# Patient Record
Sex: FEMALE | Race: ASIAN | NOT HISPANIC OR LATINO | URBAN - METROPOLITAN AREA
[De-identification: names, ages, dates, MRNs, and addresses within clinical notes are randomized per-mention and may not be internally consistent; named-entity substitution may affect disease eponyms.]

---

## 2017-11-05 ENCOUNTER — EMERGENCY (EMERGENCY)
Facility: HOSPITAL | Age: 22
LOS: 1 days | Discharge: HOME | End: 2017-11-05
Attending: EMERGENCY MEDICINE | Admitting: EMERGENCY MEDICINE
Payer: MEDICAID

## 2017-11-05 VITALS
RESPIRATION RATE: 18 BRPM | DIASTOLIC BLOOD PRESSURE: 72 MMHG | TEMPERATURE: 98 F | OXYGEN SATURATION: 100 % | HEART RATE: 74 BPM | SYSTOLIC BLOOD PRESSURE: 104 MMHG

## 2017-11-05 VITALS
TEMPERATURE: 99 F | SYSTOLIC BLOOD PRESSURE: 124 MMHG | RESPIRATION RATE: 18 BRPM | HEART RATE: 104 BPM | OXYGEN SATURATION: 100 % | DIASTOLIC BLOOD PRESSURE: 82 MMHG

## 2017-11-05 DIAGNOSIS — N83.209 UNSPECIFIED OVARIAN CYST, UNSPECIFIED SIDE: ICD-10-CM

## 2017-11-05 LAB
APPEARANCE UR: SIGNIFICANT CHANGE UP
BACTERIA # UR AUTO: SIGNIFICANT CHANGE UP
BILIRUB UR-MCNC: NEGATIVE — SIGNIFICANT CHANGE UP
BLOOD UR QL VISUAL: NEGATIVE — SIGNIFICANT CHANGE UP
COLOR SPEC: YELLOW — SIGNIFICANT CHANGE UP
GLUCOSE UR-MCNC: NEGATIVE — SIGNIFICANT CHANGE UP
KETONES UR-MCNC: NEGATIVE — SIGNIFICANT CHANGE UP
LEUKOCYTE ESTERASE UR-ACNC: HIGH
MUCOUS THREADS # UR AUTO: SIGNIFICANT CHANGE UP
NITRITE UR-MCNC: NEGATIVE — SIGNIFICANT CHANGE UP
NON-SQ EPI CELLS # UR AUTO: <1 — SIGNIFICANT CHANGE UP
PH UR: 6.5 — SIGNIFICANT CHANGE UP (ref 4.6–8)
PROT UR-MCNC: 10 — SIGNIFICANT CHANGE UP
RBC CASTS # UR COMP ASSIST: SIGNIFICANT CHANGE UP (ref 0–?)
SP GR SPEC: 1.02 — SIGNIFICANT CHANGE UP (ref 1–1.03)
SQUAMOUS # UR AUTO: SIGNIFICANT CHANGE UP
UROBILINOGEN FLD QL: NORMAL E.U. — SIGNIFICANT CHANGE UP (ref 0.1–0.2)
WBC UR QL: HIGH (ref 0–?)

## 2017-11-05 PROCEDURE — 99284 EMERGENCY DEPT VISIT MOD MDM: CPT

## 2017-11-05 PROCEDURE — 76856 US EXAM PELVIC COMPLETE: CPT | Mod: 26

## 2017-11-05 PROCEDURE — 99283 EMERGENCY DEPT VISIT LOW MDM: CPT

## 2017-11-05 NOTE — CONSULT NOTE ADULT - SUBJECTIVE AND OBJECTIVE BOX
22y G0, LMP 10/23 presents with resolved RLQ abdominal pain.  Patient had RLQ abdominal pain for 2 days but it has since resolved. Pain was 5-6/10, constant, and not associated with nausea or vomiting.  Patient has no other complaints.  Patient is not sexually active and           OB/GYN HISTORY: heavy periods treated with OCPs 4 years ago  PAST MEDICAL & SURGICAL HISTORY:  Dysphagia  GERD (gastroesophageal reflux disease)  No significant past surgical history    Allergies    No Known Allergies    Intolerances      MEDICATIONS  (STANDING): None    SOCIAL HISTORY:    Name of GYN Physician:  Date of Last Pap:  History of Abnormal Pap:  Date of Last Mammogram:  Date of Last Colonoscopy:       Vital Signs Last 24 Hrs  T(C): 36.8 (2017 12:25), Max: 37.1 (2017 10:22)  T(F): 98.2 (2017 12:25), Max: 98.8 (2017 10:22)  HR: 74 (2017 12:25) (74 - 104)  BP: 104/72 (2017 12:25) (104/72 - 128/78)  BP(mean): --  RR: 18 (2017 12:25) (18 - 18)  SpO2: 100% (2017 12:25) (98% - 100%)    PHYSICAL EXAM:      Constitutional: alert and oriented x 3    Breasts: no tenderness, no nodules    Respiratory: clear    Cardiovascular: regular rate and rhythm    Gastrointestinal: soft, non tender, + bowel sounds. No hepatosplenomegaly, no palpable masses    Genitourinary: NEFG  Cervix: closed/ long, no CMT  Uterus: normal size, non tender  Adnexa: non tender, no palpable masses    Rectal:     Extremities:    Neurological:    Skin:    Lymph Nodes:        LABS:            Urinalysis Basic - ( 2017 11:00 )    Color: YELLOW / Appearance: HAZY / S.021 / pH: 6.5  Gluc: NEGATIVE / Ketone: NEGATIVE  / Bili: NEGATIVE / Urobili: NORMAL E.U.   Blood: NEGATIVE / Protein: 10 / Nitrite: NEGATIVE   Leuk Esterase: SMALL / RBC: 2-5 / WBC 5-10   Sq Epi: MANY / Non Sq Epi: x / Bacteria: FEW            RADIOLOGY & ADDITIONAL STUDIES:  TAUS (): EXAM:  US PELVIC COMPLETE        PROCEDURE DATE:  2017         INTERPRETATION:  CLINICAL INFORMATION: Right adnexal pain 2 days ago    LMP: 10/23/2017. Urine pregnancy test is pending, however patient has   never been sexually active.    COMPARISON: None available.    TECHNIQUE:     Transabdominal pelvic sonogram was performed. Endovaginal examination was   declined due to patient's Confucianism practices. Color and Spectral Doppler   was performed.    FINDINGS:    Uterus: 7.6 x 3.1 x 4.6 cm. Within normal limits.    Endometrium: 13 mm. Within normal limits.    Right ovary: 2.6 x 1.6 x 2.2 cm. A large, 10.8 x 7.8 x 12.4 cm   paraovarian cyst is noted. There is normal ovarian flow.    Left ovary: 3.2 x 2.2 x 2.3 cm, containing a 1.4 x 1.1 x 1.8 cm   follicular cyst.    Fluid: None.    IMPRESSION:  Large right paraovarian cyst measuring up to 12.4 cm. No current evidence   of right ovarian torsion, however given the size of the cyst, repeated or   intermittent torsion events are likely. 22y G0, LMP 10/23 presents with resolved RLQ abdominal pain.  Patient had RLQ abdominal pain for 2 days but it has since resolved. Pain was 5-6/10, constant, and not associated with nausea or vomiting.  Patient has no other complaints.  Patient is not sexually active and is refusing pelvic exam and transvaginal ultrasound.    OB/GYN HISTORY: heavy periods treated with OCPs 4 years ago  PAST MEDICAL & SURGICAL HISTORY:  Dysphagia  GERD (gastroesophageal reflux disease)  No significant past surgical history    Allergies: No Known Allergies  MEDICATIONS  (STANDING): None      Vital Signs Last 24 Hrs  T(C): 36.8 (2017 12:25), Max: 37.1 (2017 10:22)  T(F): 98.2 (2017 12:25), Max: 98.8 (2017 10:)  HR: 74 (2017 12:25) (74 - 104)  BP: 104/72 (2017 12:25) (104/72 - 128/78)  BP(mean): --  RR: 18 (2017 12:25) (18 - 18)  SpO2: 100% (2017 12:25) (98% - 100%)    PHYSICAL EXAM:    Constitutional: alert and oriented x 3    Respiratory: clear    Cardiovascular: regular rate and rhythm    Gastrointestinal: soft, non tender, + bowel sounds. No hepatosplenomegaly, no palpable masses    Genitourinary: refused    Urinalysis Basic - ( 2017 11:00 )    Color: YELLOW / Appearance: HAZY / S.021 / pH: 6.5  Gluc: NEGATIVE / Ketone: NEGATIVE  / Bili: NEGATIVE / Urobili: NORMAL E.U.   Blood: NEGATIVE / Protein: 10 / Nitrite: NEGATIVE   Leuk Esterase: SMALL / RBC: 2-5 / WBC 5-10   Sq Epi: MANY / Non Sq Epi: x / Bacteria: FEW        RADIOLOGY & ADDITIONAL STUDIES:  TAUS (): EXAM:  US PELVIC COMPLETE        PROCEDURE DATE:  2017         INTERPRETATION:  CLINICAL INFORMATION: Right adnexal pain 2 days ago    LMP: 10/23/2017. Urine pregnancy test is pending, however patient has   never been sexually active.    COMPARISON: None available.    TECHNIQUE:     Transabdominal pelvic sonogram was performed. Endovaginal examination was   declined due to patient's Hinduism practices. Color and Spectral Doppler   was performed.    FINDINGS:    Uterus: 7.6 x 3.1 x 4.6 cm. Within normal limits.    Endometrium: 13 mm. Within normal limits.    Right ovary: 2.6 x 1.6 x 2.2 cm. A large, 10.8 x 7.8 x 12.4 cm   paraovarian cyst is noted. There is normal ovarian flow.    Left ovary: 3.2 x 2.2 x 2.3 cm, containing a 1.4 x 1.1 x 1.8 cm   follicular cyst.    Fluid: None.    IMPRESSION:  Large right paraovarian cyst measuring up to 12.4 cm. No current evidence   of right ovarian torsion, however given the size of the cyst, repeated or   intermittent torsion events are likely.

## 2017-11-05 NOTE — CONSULT NOTE ADULT - ATTENDING COMMENTS
pt seen by me personally   vss afebrile  ucg neg not sexually active  abd soft nt nd    a/p known cyst stable no evidence torsion  discussed with pt need for fu and likely removal  clinic appointment to be given for this week  strict instructions for any pain

## 2017-11-05 NOTE — CONSULT NOTE ADULT - PROBLEM SELECTOR RECOMMENDATION 9
-Patient stable for discharge to follow-up outpatient this week  -tylenol and Motrin as needed for analgesia  -Torsion precautions given    Patient seen and examined with Dr Diana Valencia PGY2

## 2017-11-05 NOTE — ED PROVIDER NOTE - OBJECTIVE STATEMENT
23 y/o F with no pertinent PMHx presents to the ED with complaint of R side abdominal pain. Pt notes prior visit to [facility] for possible ovarian cyst, but notes benign sonogram findings. She states that yesterday at 3 pm pt felt sudden pain in the R side of her abdomen. She states that the pain went away, but decided to come in to check up. She denies any other medical issues, and pas t surgeries. She is otherwise healthy, and has no complaints urinary issues, HA, fever, nausea/vomiting/diarrhea, discharge, and no other complaints.

## 2017-11-05 NOTE — ED ADULT TRIAGE NOTE - CHIEF COMPLAINT QUOTE
c/o RLQ abd pain x 2 days "I actually feel better today but the pain was bad."  denies dysuria.  denies pregnancy.   h/o right ovarian cyst

## 2017-11-05 NOTE — ED PROVIDER NOTE - MEDICAL DECISION MAKING DETAILS
Likely ovarian cyst will rule out possible ovarian torsion. Likely ovarian cyst will rule out possible ovarian torsion.  Obtain transabdominal pelvic ultrasound as patient has never been sexually active.  Also check UA for UTI.

## 2017-11-13 ENCOUNTER — OUTPATIENT (OUTPATIENT)
Dept: OUTPATIENT SERVICES | Facility: HOSPITAL | Age: 22
LOS: 1 days | End: 2017-11-13

## 2017-11-13 ENCOUNTER — APPOINTMENT (OUTPATIENT)
Dept: OBGYN | Facility: HOSPITAL | Age: 22
End: 2017-11-13
Payer: MEDICAID

## 2017-11-13 VITALS
SYSTOLIC BLOOD PRESSURE: 117 MMHG | BODY MASS INDEX: 22.96 KG/M2 | DIASTOLIC BLOOD PRESSURE: 84 MMHG | HEIGHT: 64 IN | WEIGHT: 134.5 LBS | HEART RATE: 90 BPM

## 2017-11-13 PROCEDURE — 99213 OFFICE O/P EST LOW 20 MIN: CPT | Mod: GE

## 2017-11-14 DIAGNOSIS — N83.202 UNSPECIFIED OVARIAN CYST, LEFT SIDE: ICD-10-CM

## 2018-08-17 ENCOUNTER — EMERGENCY (EMERGENCY)
Facility: HOSPITAL | Age: 23
LOS: 1 days | Discharge: HOME | End: 2018-08-17
Attending: EMERGENCY MEDICINE | Admitting: EMERGENCY MEDICINE
Payer: MEDICAID

## 2018-08-17 VITALS
TEMPERATURE: 98 F | RESPIRATION RATE: 16 BRPM | HEART RATE: 72 BPM | SYSTOLIC BLOOD PRESSURE: 101 MMHG | DIASTOLIC BLOOD PRESSURE: 72 MMHG | OXYGEN SATURATION: 100 %

## 2018-08-17 VITALS
TEMPERATURE: 98 F | RESPIRATION RATE: 16 BRPM | OXYGEN SATURATION: 100 % | HEART RATE: 98 BPM | SYSTOLIC BLOOD PRESSURE: 136 MMHG | DIASTOLIC BLOOD PRESSURE: 66 MMHG

## 2018-08-17 DIAGNOSIS — N83.201 UNSPECIFIED OVARIAN CYST, RIGHT SIDE: ICD-10-CM

## 2018-08-17 LAB
ALBUMIN SERPL ELPH-MCNC: 4.3 G/DL — SIGNIFICANT CHANGE UP (ref 3.3–5)
ALP SERPL-CCNC: 50 U/L — SIGNIFICANT CHANGE UP (ref 40–120)
ALT FLD-CCNC: 10 U/L — SIGNIFICANT CHANGE UP (ref 4–33)
ANISOCYTOSIS BLD QL: SIGNIFICANT CHANGE UP
APPEARANCE UR: CLEAR — SIGNIFICANT CHANGE UP
APTT BLD: 28.4 SEC — SIGNIFICANT CHANGE UP (ref 27.5–37.4)
AST SERPL-CCNC: 13 U/L — SIGNIFICANT CHANGE UP (ref 4–32)
BACTERIA # UR AUTO: NEGATIVE — SIGNIFICANT CHANGE UP
BASOPHILS # BLD AUTO: 0.05 K/UL — SIGNIFICANT CHANGE UP (ref 0–0.2)
BASOPHILS NFR BLD AUTO: 0.5 % — SIGNIFICANT CHANGE UP (ref 0–2)
BILIRUB SERPL-MCNC: 0.2 MG/DL — SIGNIFICANT CHANGE UP (ref 0.2–1.2)
BILIRUB UR-MCNC: NEGATIVE — SIGNIFICANT CHANGE UP
BLOOD UR QL VISUAL: NEGATIVE — SIGNIFICANT CHANGE UP
BUN SERPL-MCNC: 11 MG/DL — SIGNIFICANT CHANGE UP (ref 7–23)
CALCIUM SERPL-MCNC: 9.3 MG/DL — SIGNIFICANT CHANGE UP (ref 8.4–10.5)
CHLORIDE SERPL-SCNC: 101 MMOL/L — SIGNIFICANT CHANGE UP (ref 98–107)
CO2 SERPL-SCNC: 24 MMOL/L — SIGNIFICANT CHANGE UP (ref 22–31)
COLOR SPEC: COLORLESS — SIGNIFICANT CHANGE UP
CREAT SERPL-MCNC: 0.99 MG/DL — SIGNIFICANT CHANGE UP (ref 0.5–1.3)
ELLIPTOCYTES BLD QL SMEAR: SLIGHT — SIGNIFICANT CHANGE UP
EOSINOPHIL # BLD AUTO: 0.09 K/UL — SIGNIFICANT CHANGE UP (ref 0–0.5)
EOSINOPHIL NFR BLD AUTO: 0.9 % — SIGNIFICANT CHANGE UP (ref 0–6)
GLUCOSE SERPL-MCNC: 88 MG/DL — SIGNIFICANT CHANGE UP (ref 70–99)
GLUCOSE UR-MCNC: NEGATIVE — SIGNIFICANT CHANGE UP
HCG SERPL-ACNC: < 5 MIU/ML — SIGNIFICANT CHANGE UP
HCT VFR BLD CALC: 38.5 % — SIGNIFICANT CHANGE UP (ref 34.5–45)
HGB BLD-MCNC: 12.1 G/DL — SIGNIFICANT CHANGE UP (ref 11.5–15.5)
HYPOCHROMIA BLD QL: SLIGHT — SIGNIFICANT CHANGE UP
IMM GRANULOCYTES # BLD AUTO: 0.04 # — SIGNIFICANT CHANGE UP
IMM GRANULOCYTES NFR BLD AUTO: 0.4 % — SIGNIFICANT CHANGE UP (ref 0–1.5)
INR BLD: 0.97 — SIGNIFICANT CHANGE UP (ref 0.88–1.17)
KETONES UR-MCNC: NEGATIVE — SIGNIFICANT CHANGE UP
LEUKOCYTE ESTERASE UR-ACNC: SIGNIFICANT CHANGE UP
LYMPHOCYTES # BLD AUTO: 2.23 K/UL — SIGNIFICANT CHANGE UP (ref 1–3.3)
LYMPHOCYTES # BLD AUTO: 22.3 % — SIGNIFICANT CHANGE UP (ref 13–44)
MANUAL SMEAR VERIFICATION: SIGNIFICANT CHANGE UP
MCHC RBC-ENTMCNC: 22.5 PG — LOW (ref 27–34)
MCHC RBC-ENTMCNC: 31.4 % — LOW (ref 32–36)
MCV RBC AUTO: 71.7 FL — LOW (ref 80–100)
MICROCYTES BLD QL: SIGNIFICANT CHANGE UP
MONOCYTES # BLD AUTO: 0.79 K/UL — SIGNIFICANT CHANGE UP (ref 0–0.9)
MONOCYTES NFR BLD AUTO: 7.9 % — SIGNIFICANT CHANGE UP (ref 2–14)
NEUTROPHILS # BLD AUTO: 6.82 K/UL — SIGNIFICANT CHANGE UP (ref 1.8–7.4)
NEUTROPHILS NFR BLD AUTO: 68 % — SIGNIFICANT CHANGE UP (ref 43–77)
NITRITE UR-MCNC: NEGATIVE — SIGNIFICANT CHANGE UP
NRBC # FLD: 0 — SIGNIFICANT CHANGE UP
OVALOCYTES BLD QL SMEAR: SLIGHT — SIGNIFICANT CHANGE UP
PH UR: 6 — SIGNIFICANT CHANGE UP (ref 5–8)
PLATELET # BLD AUTO: 284 K/UL — SIGNIFICANT CHANGE UP (ref 150–400)
PLATELET COUNT - ESTIMATE: NORMAL — SIGNIFICANT CHANGE UP
PMV BLD: 10.7 FL — SIGNIFICANT CHANGE UP (ref 7–13)
POIKILOCYTOSIS BLD QL AUTO: SIGNIFICANT CHANGE UP
POTASSIUM SERPL-MCNC: 3.9 MMOL/L — SIGNIFICANT CHANGE UP (ref 3.5–5.3)
POTASSIUM SERPL-SCNC: 3.9 MMOL/L — SIGNIFICANT CHANGE UP (ref 3.5–5.3)
PROT SERPL-MCNC: 7.3 G/DL — SIGNIFICANT CHANGE UP (ref 6–8.3)
PROT UR-MCNC: NEGATIVE — SIGNIFICANT CHANGE UP
PROTHROM AB SERPL-ACNC: 11.2 SEC — SIGNIFICANT CHANGE UP (ref 9.8–13.1)
RBC # BLD: 5.37 M/UL — HIGH (ref 3.8–5.2)
RBC # FLD: 15.4 % — HIGH (ref 10.3–14.5)
RBC CASTS # UR COMP ASSIST: SIGNIFICANT CHANGE UP (ref 0–?)
SODIUM SERPL-SCNC: 140 MMOL/L — SIGNIFICANT CHANGE UP (ref 135–145)
SP GR SPEC: 1 — SIGNIFICANT CHANGE UP (ref 1–1.04)
SQUAMOUS # UR AUTO: SIGNIFICANT CHANGE UP
UROBILINOGEN FLD QL: NORMAL — SIGNIFICANT CHANGE UP
WBC # BLD: 10.02 K/UL — SIGNIFICANT CHANGE UP (ref 3.8–10.5)
WBC # FLD AUTO: 10.02 K/UL — SIGNIFICANT CHANGE UP (ref 3.8–10.5)
WBC CASTS UR QL COMP ASSIST: NEGATIVE — SIGNIFICANT CHANGE UP
WBC UR QL: HIGH (ref 0–?)

## 2018-08-17 PROCEDURE — 76856 US EXAM PELVIC COMPLETE: CPT | Mod: 26

## 2018-08-17 PROCEDURE — 99235 HOSP IP/OBS SAME DATE MOD 70: CPT | Mod: GC

## 2018-08-17 PROCEDURE — 93976 VASCULAR STUDY: CPT | Mod: 26,59

## 2018-08-17 PROCEDURE — 74177 CT ABD & PELVIS W/CONTRAST: CPT | Mod: 26

## 2018-08-17 PROCEDURE — 99284 EMERGENCY DEPT VISIT MOD MDM: CPT

## 2018-08-17 RX ORDER — ACETAMINOPHEN 500 MG
975 TABLET ORAL ONCE
Qty: 0 | Refills: 0 | Status: DISCONTINUED | OUTPATIENT
Start: 2018-08-17 | End: 2018-08-17

## 2018-08-17 RX ORDER — SODIUM CHLORIDE 9 MG/ML
1000 INJECTION INTRAMUSCULAR; INTRAVENOUS; SUBCUTANEOUS ONCE
Qty: 0 | Refills: 0 | Status: COMPLETED | OUTPATIENT
Start: 2018-08-17 | End: 2018-08-17

## 2018-08-17 RX ORDER — ACETAMINOPHEN 500 MG
1000 TABLET ORAL ONCE
Qty: 0 | Refills: 0 | Status: COMPLETED | OUTPATIENT
Start: 2018-08-17 | End: 2018-08-17

## 2018-08-17 RX ADMIN — SODIUM CHLORIDE 1000 MILLILITER(S): 9 INJECTION INTRAMUSCULAR; INTRAVENOUS; SUBCUTANEOUS at 14:10

## 2018-08-17 RX ADMIN — Medication 400 MILLIGRAM(S): at 14:07

## 2018-08-17 NOTE — CONSULT NOTE ADULT - PROBLEM SELECTOR RECOMMENDATION 9
No Torsion:   -no acute GYN intervention at this time  -patient counseled on PO pain medication for pain relief prn   -patient instructed to follow up in GYN clinic on Wednesday at 1pm to schedule laparoscopic surgery to remove cyst   -patient counselled on reasons to return to ED including severe abdominal pain not responsive to pain medications or inability to tolerate PO intake.  Patient expressed understanding.  All questions/concerns addressed.   -patient cleared for discharge from OBGYN perspective.  Primary managment per ED team.     Pt seen and d/w Dr. Robyn Beckman PGY2

## 2018-08-17 NOTE — CONSULT NOTE ADULT - ASSESSMENT
22y G0    here w/ complaint of abdominal pain with US showing a 12x9.6x10cm right sided paraovarian cyst unchanged from previous imaging 11/2017. Patient is without abdominal pain at this time, with benign exam.    No Torsion:   Patient with overall benign abdomen although with some mild tenderness to palpation on side of cyst.  Differential for pain includes pain from large space occupying ovarian cyst.  Low concern for torsion at this time given benign abdomen and clinical picture with overall well appearing well mentating patient.      Given size of cyst  it was explained to patient that she would benefit from follow up with an OBGYN in [facility] clinic to discuss scheduled laprascopic ovarian cystectomy.  Explained need for follow up with in GYN clinic on Wednesday at 1pm and provided patient with excusal note for work in order for this visit. Given patient declines surgical intervention at this time, patient was extensively counselled on reasons to return to ED including severe abdominal pain not responsive to pain medications or inability to tolerate PO intake.  Patient expressed understanding.  All questions/concerns addressed.

## 2018-08-17 NOTE — CONSULT NOTE ADULT - SUBJECTIVE AND OBJECTIVE BOX
Consulted for RLQ with ovarian cyst at 4:45pm, patient seen and evaluated by resident at 5pm. Pt seen with attending at 5:30pm. full consult note to follow Consulted for RLQ with ovarian cyst at 4:45pm, patient seen and evaluated by resident at 5pm. Pt seen with attending at 5:30pm.     Patient is 23yo G0 LMP 18 presenting with chief complaint of abdominal pain. Patient notes that pain started on at 9am and was intermittent and twisting in nature. Pain occurs in episodes lasting <30sec, 10-12x today. Pain severity is 3-4/10. Patient admits to having had similar pain in November when she came to ED and was found to have a large right ovarian cyst. Patient denies pain at this time. She received tylenol at 2pm for pain but said that her pain was mild.  When patient previously came to ED in November she was counselled regarding surgery but declined surgical intervention at that time. She then was lost to follow up after presenting for one preop clinic visit. Today patient presents to ED with goal of following up regarding her cyst in order to pursue surgery for its removal. She denies any pain during at this time and does not desire surgery today. Patient would prefer to schedule surgery for a later date when her parents are available.     She denies chest pain, shortness of breath, nausea, vomiting, fevers, and chills. Patient denies vaginal bleeding, abnormal vaginal discharge, and spotting. Patient denies dysuria, hematuria, back pain and urinary frequency. Patient is passing flatus and having formed bowel movements. She denies constipation and diarrhea.      OB/GYN HISTORY: h/o R ovarian cyst [id number] in 2017, h/o heavy periods treated with OCPs 4 years ago; menarche 9yo, regular menses q28d, bleeding 4-5d, 4pads per day. Never sexually active,   PAST MEDICAL & SURGICAL HISTORY:  Dysphagia  GERD (gastroesophageal reflux disease)  No significant past surgical history    Allergies: No Known Allergies  MEDICATIONS  (STANDING): None      O:   Vital Signs Last 24 Hrs  T(C): 36.7 (17 Aug 2018 15:43), Max: 36.8 (17 Aug 2018 11:46)  T(F): 98.1 (17 Aug 2018 15:43), Max: 98.3 (17 Aug 2018 11:46)  HR: 72 (17 Aug 2018 15:43) (72 - 98)  BP: 101/72 (17 Aug 2018 15:43) (101/72 - 136/66)  BP(mean): --  RR: 16 (17 Aug 2018 15:43) (16 - 16)  SpO2: 100% (17 Aug 2018 15:43) (100% - 100%)    PE:  Gen: Comfortable, NAD  CV: RRR  Pulm: CTAB  Abd: Soft, nondistended, no rebound, no guarding, +mass palpated in RLQ with mild tenderness on exam  Pelvic exam: Patient refused     LABS:                        12.1   10.02 )-----------( 284      ( 17 Aug 2018 14:25 )             38.5         140  |  101  |  11  ----------------------------<  88  3.9   |  24  |  0.99    Ca    9.3      17 Aug 2018 14:25    TPro  7.3  /  Alb  4.3  /  TBili  0.2  /  DBili  x   /  AST  13  /  ALT  10  /  AlkPhos  50      PT/INR - ( 17 Aug 2018 14:25 )   PT: 11.2 SEC;   INR: 0.97          PTT - ( 17 Aug 2018 14:25 )  PTT:28.4 SEC  Urinalysis Basic - ( 17 Aug 2018 14:25 )    Color: COLORLESS / Appearance: CLEAR / S.005 / pH: 6.0  Gluc: NEGATIVE / Ketone: NEGATIVE  / Bili: NEGATIVE / Urobili: NORMAL   Blood: NEGATIVE / Protein: NEGATIVE / Nitrite: NEGATIVE   Leuk Esterase: SMALL / RBC: 0-2 / WBC 6-10   Sq Epi: FEW / Non Sq Epi: x / Bacteria: NEGATIVE        RADIOLOGY & ADDITIONAL STUDIES:  EXAM:  US PELVIC COMPLETE      EXAM:  US DPLX PELVIC      PROCEDURE DATE:  Aug 17 2018   INTERPRETATION:  CLINICAL INFORMATION: 22-year-old female with right   lower quadrant pain. Known right paraovarian cyst.    LMP: 2018    COMPARISON: Ultrasound 2017     TECHNIQUE:     Transabdominal pelvic sonogram only. With color and spectral Doppler.      FINDINGS:    Uterus: 6.3 x 3.8 x 4.5 cm. Within normal limits.    Endometrium: 15 mm. Within normal limits.    Right ovary: Paraovarian cyst 11.9 x 9.6 x 9.9 cm. Unchanged from   2017 without internal hemorrhage. Patient indicated only mild pain   related to the cyst. Otherwise normal morphology right ovary.    Left ovary: Within normal limits.    Fluid: None.    IMPRESSION:    Large paraovarian cyst unchanged from 2017 with mild tenderness.    DIAMOND TREJO M.D., ATTENDING RADIOLOGIST  This document has been electronically signed. Aug 17 2018  3:25PM        < from: CT Abdomen and Pelvis w/ Oral Cont and w/ IV Cont (18 @ 16:48) >    EXAM:  CT ABDOMEN AND PELVIS OC IC        PROCEDURE DATE:  Aug 17 2018         INTERPRETATION:  CLINICAL INFORMATION: RLQ pain.    COMPARISON: Pelvic sonogram 2018.    PROCEDURE:   CT of the Abdomen and Pelvis was performed with intravenous contrast.   Intravenous contrast: 90 ml Omnipaque 350. 10 ml discarded.  Oral contrast: positive contrast was administered.  Sagittal and coronal reformats were performed.    FINDINGS:    LOWER CHEST: Within normal limits.    LIVER: Within normal limits.  BILE DUCTS: Normal caliber.  GALLBLADDER: Within normal limits.  SPLEEN: Within normal limits.  PANCREAS: Within normal limits.  ADRENALS: Within normal limits.  KIDNEYS/URETERS: Within normal limits.    BLADDER: Within normal limits.  REPRODUCTIVEORGANS: There is a 12 cm right para ovarian cyst, stable in   size from the pelvic ultrasound dated 2018. Uterus is normal. 1.5 cm   right ovarian corpus luteal cyst.     BOWEL: No bowel obstruction. Appendix is normal.   PERITONEUM: No ascites.  VESSELS: Within normal limits. Incidental circumaortic left renal vein.  RETROPERITONEUM: No lymphadenopathy.    ABDOMINAL WALL: Within normal limits.  BONES: Within normal limits. Small bone island in the right   intertrochanteric region of the femur.    IMPRESSION: Normal appendix.  Stable right paraovarian cyst.       CINDY PELAYO M.D., RADIOLOGY RESIDENT  This document has been electronically signed.  GAGANDEEP AGUILERA M.D., ATTENDING RADIOLOGIST  This document has been electronically signed.Aug 17 2018  5:16PM

## 2018-08-17 NOTE — ED PROVIDER NOTE - ATTENDING CONTRIBUTION TO CARE
Dr. Berman: I have personally seen and examined this patient at the bedside. I have fully participated in the care of this patient. I have reviewed all pertinent clinical information, including history, physical exam, plan and the Resident's note and agree except as noted. HPI above as by me. PE above as by me. RLQ pain in 23yo female with known large ovary cyst DDX ectopic, ovarian torsion, appy PLAN upreg, cbc, cmp, ua, ct abd pelvis po and iv, transabd sono.

## 2018-08-17 NOTE — ED ADULT NURSE NOTE - NSIMPLEMENTINTERV_GEN_ALL_ED
Implemented All Universal Safety Interventions:  [place] to call system. Call bell, personal items and telephone within reach. Instruct patient to call for assistance. Room bathroom lighting operational. Non-slip footwear when patient is off stretcher. Physically safe environment: no spills, clutter or unnecessary equipment. Stretcher in lowest position, wheels locked, appropriate side rails in place.

## 2018-08-17 NOTE — ED PROVIDER NOTE - MEDICAL DECISION MAKING DETAILS
RLQ pain in 21yo female with known large ovary cyst DDX ectopic, ovarian torsion, appy PLAN upreg, cbc, cmp, ua, ct abd pelvis po and iv, transabd sono

## 2018-08-17 NOTE — ED PROVIDER NOTE - PROGRESS NOTE DETAILS
Cullen att: Patient seen by OBGYN, negative abd pain, told GYN she "came to ER for a checkup," and has 2/2 abd pain. Gyn attending here, states patient Follow up set up and she will be followed up for cyst removal outpatient when her parents are able to be present with her.

## 2018-08-17 NOTE — ED PROVIDER NOTE - CARE PLAN
Principal Discharge DX:	Right lower quadrant abdominal tenderness without rebound tenderness Principal Discharge DX:	Right lower quadrant abdominal tenderness without rebound tenderness  Assessment and plan of treatment:	You were seen in the ER for abdominal pain. You must follow up with your primary physician in 24 to 48 hours. Return to the ER for any new or worsening signs/symptoms. See handout for additional reasons to return to the ER.   1) You must follow up with gynecology this week.  2) Take ibuprofen, 600 mg,  every 6 to 8 hours as needed for pain with a maximum daily dose of 1800 mg.

## 2018-08-17 NOTE — ED PROVIDER NOTE - PLAN OF CARE
You were seen in the ER for abdominal pain. You must follow up with your primary physician in 24 to 48 hours. Return to the ER for any new or worsening signs/symptoms. See handout for additional reasons to return to the ER.   1) You must follow up with gynecology this week.  2) Take ibuprofen, 600 mg,  every 6 to 8 hours as needed for pain with a maximum daily dose of 1800 mg.

## 2018-08-17 NOTE — ED PROVIDER NOTE - OBJECTIVE STATEMENT
13:17 Cullen att: 22F c/o rlq pain     Denies fever, chills, nausea, vomiting, abd pain, foreign travel, recent abx, recent hospitalization, h/o cdiff, camping or sick contacts.   IMM PMH PSH MED ALL SMOKE PCP PHARMACY 13:17 Cullen att: 22F h/o 12.4 cm rt para-ovarian cyst c/o rlq pain x 2 days. 11/5/2017 Patient diagnosed with 12.4cm rt para-ovarian cyst, offered detorsion surgery and patient declined. Past two days patient notes abd pain, points right of umbilicus, non-rad, discomfort, not severe. Denies fever, chills, nausea, vomiting, abd pain, vag bleed, vag discharge, or sexual activity. LMP 7/20 PMH ov cyst PSH x MED x ALL x SMOKE PCP PHARMACY

## 2018-08-17 NOTE — CONSULT NOTE ADULT - ATTENDING COMMENTS
patient seen and examined at bedside. agree with above H&P.  patient has 12cm cyst that has been stable since last year, November 2017. Patient followed up in GYN clinic last year after her ER visit, and was supposed to be scheduled for a laparoscopic cystectomy, but never confirmed the booking date.   Patient called the clinic recently and made an appointment for 9/5/2018. she wanted to come earlier so came to the ER instead thinking it was a clinic visit.   Patient states she had mild pain yesterday 3/10 and took tylenol and it resolved. Now states has no pain. No tenderness/rebound/guarding on exam.   offered surgical intervention tonight but patient refused. Wants to schedule as an outpatient. Made appt for patient in gyn clinic on Wednesday 8/22 at 1 pm to book surgery.   given torsion precautions

## 2018-08-19 LAB
BACTERIA UR CULT: SIGNIFICANT CHANGE UP
SPECIMEN SOURCE: SIGNIFICANT CHANGE UP

## 2018-08-22 ENCOUNTER — APPOINTMENT (OUTPATIENT)
Dept: OBGYN | Facility: HOSPITAL | Age: 23
End: 2018-08-22
Payer: MEDICAID

## 2018-08-22 ENCOUNTER — OUTPATIENT (OUTPATIENT)
Dept: OUTPATIENT SERVICES | Facility: HOSPITAL | Age: 23
LOS: 1 days | End: 2018-08-22

## 2018-08-22 VITALS
BODY MASS INDEX: 21.17 KG/M2 | WEIGHT: 124 LBS | SYSTOLIC BLOOD PRESSURE: 119 MMHG | DIASTOLIC BLOOD PRESSURE: 70 MMHG | HEIGHT: 64 IN | HEART RATE: 73 BPM

## 2018-08-22 DIAGNOSIS — Z00.00 ENCOUNTER FOR GENERAL ADULT MEDICAL EXAMINATION W/OUT ABNORMAL FINDINGS: ICD-10-CM

## 2018-08-22 PROCEDURE — 99213 OFFICE O/P EST LOW 20 MIN: CPT | Mod: GC

## 2018-08-23 DIAGNOSIS — Z00.00 ENCOUNTER FOR GENERAL ADULT MEDICAL EXAMINATION WITHOUT ABNORMAL FINDINGS: ICD-10-CM

## 2018-08-23 DIAGNOSIS — N83.201 UNSPECIFIED OVARIAN CYST, RIGHT SIDE: ICD-10-CM

## 2018-09-05 ENCOUNTER — APPOINTMENT (OUTPATIENT)
Dept: OBGYN | Facility: HOSPITAL | Age: 23
End: 2018-09-05

## 2025-06-10 NOTE — ED PROVIDER NOTE - ABDOMINAL EXAM
non-clinical appt info/Event Note
no organomegaly/[place] TENDERNESS/soft/tender.../nondistended